# Patient Record
Sex: MALE | Race: WHITE | NOT HISPANIC OR LATINO | Employment: FULL TIME | URBAN - METROPOLITAN AREA
[De-identification: names, ages, dates, MRNs, and addresses within clinical notes are randomized per-mention and may not be internally consistent; named-entity substitution may affect disease eponyms.]

---

## 2018-04-17 ENCOUNTER — OFFICE VISIT (OUTPATIENT)
Dept: FAMILY MEDICINE CLINIC | Facility: CLINIC | Age: 33
End: 2018-04-17
Payer: OTHER MISCELLANEOUS

## 2018-04-17 ENCOUNTER — TRANSCRIBE ORDERS (OUTPATIENT)
Dept: FAMILY MEDICINE CLINIC | Facility: CLINIC | Age: 33
End: 2018-04-17

## 2018-04-17 VITALS
OXYGEN SATURATION: 95 % | HEART RATE: 63 BPM | BODY MASS INDEX: 37.28 KG/M2 | DIASTOLIC BLOOD PRESSURE: 78 MMHG | SYSTOLIC BLOOD PRESSURE: 118 MMHG | RESPIRATION RATE: 17 BRPM | HEIGHT: 70 IN | TEMPERATURE: 98.4 F | WEIGHT: 260.4 LBS

## 2018-04-17 DIAGNOSIS — S52.022A OLECRANON FRACTURE, LEFT, CLOSED, INITIAL ENCOUNTER: ICD-10-CM

## 2018-04-17 DIAGNOSIS — Z01.818 PREOP EXAMINATION: Primary | ICD-10-CM

## 2018-04-17 DIAGNOSIS — S49.92XA ARM INJURY, LEFT, INITIAL ENCOUNTER: Primary | ICD-10-CM

## 2018-04-17 PROCEDURE — 99243 OFF/OP CNSLTJ NEW/EST LOW 30: CPT | Performed by: FAMILY MEDICINE

## 2018-04-17 NOTE — PROGRESS NOTES
Assessment/Plan:   1  Preop examination  Patient appears well today  He has a good functional capacity and no active cardiac problems  Reviewed the CT imaging with him  He is scheduled for fixation of this fracture on 4/19  This type of procedure is considered intermediate risk  Patient is cleared with low perioperative cardiovascular risk  No further testing needed today  2  Olecranon fracture, left, closed, initial encounter       There are no diagnoses linked to this encounter  Subjective:    Chief Complaint   Patient presents with    Pre-op Exam     Shattered Elbow 04/19 @ [address] [facility]        Patient ID: Angela Stahl is a 27 y o  male  Angela Stahl  27 y o   male    SURGEON: Dr Samreen Paredes: Left elbow fixation    DATE OF SURGERY: 4/19/18    PRIOR ANESTHESIA:no    COMPLICATION: no    BLEEDING PROBLEM: no    PERTINENT PMH: no    EXERCISE CAPACITY:   CAN WALK 4 BLOCKS AND OR CLIMB 2 FLIGHTS: Yes    HOME LIVING SITUATION SAFE AND SECURE: Yes      TOBACCO: no     ETOH: yes occasionally      ILLEGAL DRUGS: no        Review of Systems   Constitutional: Negative for activity change, chills, fatigue and fever  HENT: Negative for congestion, ear pain, sinus pressure and sore throat  Eyes: Negative for redness, itching and visual disturbance  Respiratory: Negative for cough and shortness of breath  Cardiovascular: Negative for chest pain and palpitations  Gastrointestinal: Negative for abdominal pain, diarrhea and nausea  Endocrine: Negative for cold intolerance and heat intolerance  Genitourinary: Negative for dysuria, flank pain and frequency  Musculoskeletal: Negative for arthralgias, back pain, gait problem and myalgias  Skin: Negative for color change  Allergic/Immunologic: Negative for environmental allergies  Neurological: Negative for dizziness, numbness and headaches  Psychiatric/Behavioral: Negative for behavioral problems and sleep disturbance  The following portions of the patient's history were reviewed and updated as appropriate : past family history, past medical history, past social history and past surgical history  Objective:    Vitals:    04/17/18 1302   BP: 118/78   BP Location: Right arm   Patient Position: Sitting   Cuff Size: Large   Pulse: 63   Resp: 17   Temp: 98 4 °F (36 9 °C)   TempSrc: Tympanic   SpO2: 95%   Weight: 118 kg (260 lb 6 4 oz)   Height: 5' 9 5" (1 765 m)        Physical Exam   Constitutional: He is oriented to person, place, and time  He appears well-developed and well-nourished  HENT:   Head: Normocephalic and atraumatic  Nose: Nose normal    Mouth/Throat: No oropharyngeal exudate  Eyes: Pupils are equal, round, and reactive to light  Right eye exhibits no discharge  Left eye exhibits no discharge  Neck: Normal range of motion  Neck supple  No tracheal deviation present  Cardiovascular: Normal rate, regular rhythm and intact distal pulses  Exam reveals no gallop and no friction rub  No murmur heard  Pulses:       Dorsalis pedis pulses are 2+ on the right side, and 2+ on the left side  Posterior tibial pulses are 2+ on the right side, and 2+ on the left side  Pulmonary/Chest: Effort normal and breath sounds normal  No respiratory distress  He has no wheezes  He has no rales  Abdominal: Soft  Bowel sounds are normal  He exhibits no distension  There is no tenderness  There is no rebound and no guarding  Musculoskeletal: Normal range of motion  He exhibits no edema  Left elbow in splint  Limited mobility   Lymphadenopathy:        Head (right side): No submental and no submandibular adenopathy present  Head (left side): No submental and no submandibular adenopathy present  He has no cervical adenopathy  Right cervical: No superficial cervical, no deep cervical and no posterior cervical adenopathy present         Left cervical: No superficial cervical, no deep cervical and no posterior cervical adenopathy present  Neurological: He is alert and oriented to person, place, and time  No cranial nerve deficit or sensory deficit  Skin: Skin is warm, dry and intact  Psychiatric: His speech is normal and behavior is normal  Judgment normal  His mood appears not anxious  Cognition and memory are normal  He does not exhibit a depressed mood  Vitals reviewed

## 2018-04-18 ENCOUNTER — TELEPHONE (OUTPATIENT)
Dept: FAMILY MEDICINE CLINIC | Facility: CLINIC | Age: 33
End: 2018-04-18

## 2018-04-18 NOTE — TELEPHONE ENCOUNTER
LMOM for patient to call the office  Dr Kartik Saenz has completed patient's pre-op note; however; we do not have the name of a surgeon to sent patient's note to

## 2018-04-19 NOTE — TELEPHONE ENCOUNTER
I spoke to the pt and he stated to me that he does not know the name of his surgeon, but gave me the coordinator's name Leta Saucedo and phone number of [phone]

## 2018-04-20 ENCOUNTER — TRANSCRIBE ORDERS (OUTPATIENT)
Dept: FAMILY MEDICINE CLINIC | Facility: CLINIC | Age: 33
End: 2018-04-20

## 2022-09-06 ENCOUNTER — TRANSITIONAL CARE MANAGEMENT (OUTPATIENT)
Dept: FAMILY MEDICINE CLINIC | Facility: CLINIC | Age: 37
End: 2022-09-06

## 2022-09-12 ENCOUNTER — OFFICE VISIT (OUTPATIENT)
Dept: FAMILY MEDICINE CLINIC | Facility: CLINIC | Age: 37
End: 2022-09-12
Payer: COMMERCIAL

## 2022-09-12 VITALS
TEMPERATURE: 97.5 F | SYSTOLIC BLOOD PRESSURE: 110 MMHG | DIASTOLIC BLOOD PRESSURE: 70 MMHG | HEIGHT: 68 IN | OXYGEN SATURATION: 95 % | RESPIRATION RATE: 16 BRPM | HEART RATE: 78 BPM | WEIGHT: 238.6 LBS | BODY MASS INDEX: 36.16 KG/M2

## 2022-09-12 DIAGNOSIS — Z90.49 S/P CHOLECYSTECTOMY: Primary | ICD-10-CM

## 2022-09-12 DIAGNOSIS — Z11.59 NEED FOR HEPATITIS C SCREENING TEST: ICD-10-CM

## 2022-09-12 DIAGNOSIS — K81.2 ACUTE ON CHRONIC CHOLECYSTITIS: ICD-10-CM

## 2022-09-12 DIAGNOSIS — R79.89 ELEVATED SERUM CREATININE: ICD-10-CM

## 2022-09-12 DIAGNOSIS — Z13.6 SCREENING FOR CARDIOVASCULAR CONDITION: ICD-10-CM

## 2022-09-12 DIAGNOSIS — Z11.4 SCREENING FOR HIV (HUMAN IMMUNODEFICIENCY VIRUS): ICD-10-CM

## 2022-09-12 PROCEDURE — 99496 TRANSJ CARE MGMT HIGH F2F 7D: CPT | Performed by: FAMILY MEDICINE

## 2022-09-12 RX ORDER — OXYCODONE HYDROCHLORIDE 5 MG/1
5 TABLET ORAL EVERY 6 HOURS PRN
COMMUNITY
Start: 2022-09-05 | End: 2022-09-12

## 2022-09-12 NOTE — ASSESSMENT & PLAN NOTE
Advised to repeat labs; appears to have been elevated in past; given young age may benefit from nephrology evaluation

## 2022-09-12 NOTE — PROGRESS NOTES
Assessment/Plan:     1  S/P cholecystectomy  Assessment & Plan:  Doing well s/p surgery; advised f/u with surgeon as scheduled; f/u guidance given should any concerning sx develop      2  Acute on chronic cholecystitis    3  Elevated serum creatinine  Assessment & Plan:  Advised to repeat labs; appears to have been elevated in past; given young age may benefit from nephrology evaluation    Orders:  -     Comprehensive metabolic panel; Future    4  Need for hepatitis C screening test  -     Hepatitis C Antibody ([facility]); Future    5  Screening for HIV (human immunodeficiency virus)  -     HIV 1/2 Antigen/Antibody (4th Generation) w Reflex [facility]; Future    6  Screening for cardiovascular condition  -     Lipid Panel with Direct LDL reflex; Future        Subjective:      Patient ID: Latasha Chaves is a 43 y o  male  Patient is here for hospital follow up s/p cholecystectomy on 9/5/22  Pt was admitted from 9/5/22-9/6/22  Patient admits he has not done much since being home as he has weight limit for lifting with surgery  Has f/u with surgery on Thursday  Is not requiring any pain medication  No vomiting or nausea  Tolerating diet  Last week had some diarrhea but normal currently  No fevers  Hospital Course:   "43 y o  male admitted for symptomatic cholelithiasis, found to be acute on chronic cholecystitis who underwent CHOLECYSTECTOMY, LAPAROSCOPIC (Abdomen) on 9/5/22  The patient tolerated the procedure well and there were no surgical complications  The patient tolerated a regular diet, ambulated, voided, pain was controlled   After meeting all criteria the patient was discharged home with plans to follow up outpatient "        The following portions of the patient's history were reviewed and updated as appropriate: allergies, current medications, past family history, past medical history, past social history, past surgical history, and problem list     Review of Systems   Constitutional: Negative for chills and fever  Respiratory: Negative for shortness of breath  Cardiovascular: Negative for chest pain  Gastrointestinal: Negative for blood in stool, diarrhea, nausea and vomiting  Objective:      /70 (BP Location: Right arm, Patient Position: Sitting, Cuff Size: Large)   Pulse 78   Temp 97 5 °F (36 4 °C) (Temporal)   Resp 16   Ht 5' 8" (1 727 m) Comment: Per pt  Wt 108 kg (238 lb 9 6 oz)   SpO2 95%   BMI 36 28 kg/m²          Physical Exam  Vitals reviewed  Constitutional:       General: He is not in acute distress  Appearance: Normal appearance  He is not ill-appearing, toxic-appearing or diaphoretic  HENT:      Head: Normocephalic and atraumatic  Eyes:      General: No scleral icterus  Right eye: No discharge  Left eye: No discharge  Conjunctiva/sclera: Conjunctivae normal    Cardiovascular:      Rate and Rhythm: Normal rate and regular rhythm  Pulses: Normal pulses  Heart sounds: Normal heart sounds  No murmur heard  No gallop  Pulmonary:      Effort: Pulmonary effort is normal  No respiratory distress  Breath sounds: Normal breath sounds  No stridor  No wheezing, rhonchi or rales  Abdominal:      Palpations: Abdomen is soft  Tenderness: There is no guarding or rebound  Comments: +incisions-clean, dry, no drainage or erythema   Musculoskeletal:      Right lower leg: No edema  Left lower leg: No edema  Neurological:      General: No focal deficit present  Mental Status: He is alert and oriented to person, place, and time  Psychiatric:         Mood and Affect: Mood normal          Behavior: Behavior normal          Thought Content:  Thought content normal          Judgment: Judgment normal

## 2022-09-12 NOTE — ASSESSMENT & PLAN NOTE
Doing well s/p surgery; advised f/u with surgeon as scheduled; f/u guidance given should any concerning sx develop

## 2022-11-17 ENCOUNTER — OFFICE VISIT (OUTPATIENT)
Dept: FAMILY MEDICINE CLINIC | Facility: CLINIC | Age: 37
End: 2022-11-17

## 2022-11-17 ENCOUNTER — APPOINTMENT (OUTPATIENT)
Dept: LAB | Facility: CLINIC | Age: 37
End: 2022-11-17

## 2022-11-17 VITALS
BODY MASS INDEX: 37.1 KG/M2 | SYSTOLIC BLOOD PRESSURE: 116 MMHG | WEIGHT: 244.8 LBS | TEMPERATURE: 98.4 F | OXYGEN SATURATION: 96 % | DIASTOLIC BLOOD PRESSURE: 78 MMHG | HEIGHT: 68 IN | RESPIRATION RATE: 18 BRPM | HEART RATE: 47 BPM

## 2022-11-17 DIAGNOSIS — Z11.59 NEED FOR HEPATITIS C SCREENING TEST: ICD-10-CM

## 2022-11-17 DIAGNOSIS — R79.89 ELEVATED SERUM CREATININE: ICD-10-CM

## 2022-11-17 DIAGNOSIS — Z13.6 SCREENING FOR CARDIOVASCULAR CONDITION: ICD-10-CM

## 2022-11-17 DIAGNOSIS — Z23 NEED FOR TDAP VACCINATION: ICD-10-CM

## 2022-11-17 DIAGNOSIS — Z00.00 ANNUAL PHYSICAL EXAM: Primary | ICD-10-CM

## 2022-11-17 DIAGNOSIS — R07.89 ATYPICAL CHEST PAIN: ICD-10-CM

## 2022-11-17 DIAGNOSIS — Z11.4 SCREENING FOR HIV (HUMAN IMMUNODEFICIENCY VIRUS): ICD-10-CM

## 2022-11-17 LAB
ALBUMIN SERPL BCP-MCNC: 3.5 G/DL (ref 3.5–5)
ALP SERPL-CCNC: 67 U/L (ref 46–116)
ALT SERPL W P-5'-P-CCNC: 43 U/L (ref 12–78)
ANION GAP SERPL CALCULATED.3IONS-SCNC: 5 MMOL/L (ref 4–13)
AST SERPL W P-5'-P-CCNC: 17 U/L (ref 5–45)
BILIRUB SERPL-MCNC: 0.45 MG/DL (ref 0.2–1)
BUN SERPL-MCNC: 21 MG/DL (ref 5–25)
CALCIUM SERPL-MCNC: 9.3 MG/DL (ref 8.3–10.1)
CHLORIDE SERPL-SCNC: 110 MMOL/L (ref 96–108)
CHOLEST SERPL-MCNC: 186 MG/DL
CO2 SERPL-SCNC: 26 MMOL/L (ref 21–32)
CREAT SERPL-MCNC: 1.32 MG/DL (ref 0.6–1.3)
GFR SERPL CREATININE-BSD FRML MDRD: 67 ML/MIN/1.73SQ M
GLUCOSE P FAST SERPL-MCNC: 103 MG/DL (ref 65–99)
HCV AB SER QL: NORMAL
HDLC SERPL-MCNC: 44 MG/DL
LDLC SERPL CALC-MCNC: 123 MG/DL (ref 0–100)
POTASSIUM SERPL-SCNC: 4.4 MMOL/L (ref 3.5–5.3)
PROT SERPL-MCNC: 7.9 G/DL (ref 6.4–8.4)
SODIUM SERPL-SCNC: 141 MMOL/L (ref 135–147)
TRIGL SERPL-MCNC: 95 MG/DL

## 2022-11-17 RX ORDER — OMEPRAZOLE 20 MG/1
20 CAPSULE, DELAYED RELEASE ORAL DAILY
Qty: 30 CAPSULE | Refills: 0 | Status: SHIPPED | OUTPATIENT
Start: 2022-11-17

## 2022-11-17 NOTE — PROGRESS NOTES
ADULT ANNUAL [facility]    NAME: Jez Riddle  AGE: 42 y o  SEX: male  : 1985     DATE: 2022     Assessment and Plan:     Problem List Items Addressed This Visit    None  Visit Diagnoses     Annual physical exam    -  Primary    BMI 37 0-37 9, adult              Immunizations and preventive care screenings were discussed with patient today  Appropriate education was printed on patient's after visit summary  Counseling:  Alcohol/drug use: discussed moderation in alcohol intake, the recommendations for healthy alcohol use, and avoidance of illicit drug use  Dental Health: discussed importance of regular tooth brushing, flossing, and dental visits  Injury prevention: discussed safety/seat belts, safety helmets, smoke detectors, carbon dioxide detectors, and smoking near bedding or upholstery  Sexual health: discussed sexually transmitted diseases, partner selection, use of condoms, avoidance of unintended pregnancy, and contraceptive alternatives  · Exercise: the importance of regular exercise/physical activity was discussed  Recommend exercise 3-5 times per week for at least 30 minutes  Return in 1 year (on 2023)  Chief Complaint:     Chief Complaint   Patient presents with   • Physical Exam      History of Present Illness:     Adult Annual Physical   Patient here for a comprehensive physical exam  The patient reports problems - chest pain  Diet and Physical Activity  · Diet/Nutrition: well balanced diet and limited junk food  · Exercise: walking  Depression Screening  PHQ-2/9 Depression Screening         General Health  · Sleep: sleeps poorly and gets 4-6 hours of sleep on average  · Hearing: normal - bilateral   · Vision: no vision problems     · Dental: Dentures over maxilla        Health  · History of STDs?: no      Review of Systems:     Review of Systems   Constitutional: Negative for activity change, chills, fatigue and fever  HENT: Negative for congestion, ear pain, sinus pressure and sore throat  Eyes: Negative for redness, itching and visual disturbance  Respiratory: Negative for cough and shortness of breath  Cardiovascular: Negative for chest pain and palpitations  Gastrointestinal: Negative for abdominal pain, diarrhea and nausea  Endocrine: Negative for cold intolerance and heat intolerance  Genitourinary: Negative for dysuria, flank pain and frequency  Musculoskeletal: Negative for arthralgias, back pain, gait problem and myalgias  Skin: Negative for color change  Allergic/Immunologic: Negative for environmental allergies  Neurological: Negative for dizziness, numbness and headaches  Psychiatric/Behavioral: Negative for behavioral problems and sleep disturbance  Past Medical History:     Past Medical History:   Diagnosis Date   • Condyloma acuminatum       Past Surgical History:     Past Surgical History:   Procedure Laterality Date   • TONSILLECTOMY AND ADENOIDECTOMY        Social History:     Social History     Socioeconomic History   • Marital status:      Spouse name: None   • Number of children: None   • Years of education: None   • Highest education level: None   Occupational History   • None   Tobacco Use   • Smoking status: Never   • Smokeless tobacco: Never   Substance and Sexual Activity   • Alcohol use: Not Currently     Comment: social   • Drug use: No   • Sexual activity: None   Other Topics Concern   • None   Social History Narrative   • None     Social Determinants of Health     Financial Resource Strain: Not on file   Food Insecurity: Not on file   Transportation Needs: Not on file   Physical Activity: Not on file   Stress: Not on file   Social Connections: Not on file   Intimate Partner Violence: Not on file   Housing Stability: Not on file      Family History:     History reviewed  No pertinent family history     Current Medications:     No current outpatient medications on file  No current facility-administered medications for this visit  Allergies:     No Known Allergies   Physical Exam:     /78   Pulse (!) 47   Temp 98 4 °F (36 9 °C) (Tympanic)   Resp 18   Ht 5' 8" (1 727 m)   Wt 111 kg (244 lb 12 8 oz)   SpO2 96%   BMI 37 22 kg/m²     Physical Exam  Vitals reviewed  Constitutional:       General: He is not in acute distress  Appearance: He is well-developed  He is not diaphoretic  HENT:      Head: Normocephalic and atraumatic  No right periorbital erythema or left periorbital erythema  Right Ear: Tympanic membrane normal  No decreased hearing noted  Left Ear: Tympanic membrane normal  No decreased hearing noted  Nose: Nose normal       Right Sinus: No maxillary sinus tenderness or frontal sinus tenderness  Left Sinus: No maxillary sinus tenderness or frontal sinus tenderness  Mouth/Throat:      Lips: Pink  Mouth: Mucous membranes are moist       Pharynx: No oropharyngeal exudate  Tonsils: No tonsillar exudate or tonsillar abscesses  Eyes:      General: Lids are normal       Extraocular Movements: Extraocular movements intact  Conjunctiva/sclera: Conjunctivae normal       Pupils: Pupils are equal, round, and reactive to light  Neck:      Thyroid: No thyroid mass  Trachea: Trachea normal    Cardiovascular:      Rate and Rhythm: Normal rate and regular rhythm  Pulses: Normal pulses  Heart sounds: Normal heart sounds  No murmur heard  No friction rub  No gallop  Pulmonary:      Effort: Pulmonary effort is normal  No respiratory distress  Breath sounds: Normal breath sounds  No wheezing or rales  Abdominal:      General: Bowel sounds are normal  There is no distension  Palpations: Abdomen is soft  There is no mass  Tenderness: There is no abdominal tenderness  There is no guarding     Musculoskeletal:         General: Normal range of motion  Cervical back: Normal range of motion and neck supple  Skin:     General: Skin is warm and dry  Coloration: Skin is not pale  Findings: No erythema or rash  Neurological:      Mental Status: He is alert and oriented to person, place, and time  Cranial Nerves: No cranial nerve deficit  Coordination: Coordination normal    Psychiatric:         Attention and Perception: Attention and perception normal          Mood and Affect: Mood and affect normal          Speech: Speech normal          Behavior: Behavior normal          Thought Content:  Thought content normal          Cognition and Memory: Cognition and memory normal          Judgment: Judgment normal           [facility]hab DO CARLOS Stover [address]

## 2022-11-17 NOTE — PROGRESS NOTES
Assessment/Plan:   1  Atypical chest pain  Reviewed patient's symptoms today  At this time, symptoms appear likely secondary to gastrointestinal irritation  His EKG today appear to show sinus bradycardia, no ST or T-wave changes  Given patient's history, he does consume alcohol  This may likely cause gastric irritation  Will start treatment omeprazole 20 mg daily for the next 30 days  Follow-up if any symptoms are persisting   - POCT ECG  - omeprazole (PriLOSEC) 20 mg delayed release capsule; Take 1 capsule (20 mg total) by mouth daily  Dispense: 30 capsule; Refill: 0    2  Need for Tdap vaccination  - TDAP VACCINE GREATER THAN OR EQUAL TO 8YO IM           Diagnoses and all orders for this visit:    Annual physical exam    BMI 37 0-37 9, adult          Subjective:       Chief Complaint   Patient presents with   • Physical Exam      Patient ID: Marlin Rothman is a 42 y o  male presents today with CC of intermittent chest discomfort  He has had this pain for the past few months  Symptoms started gradually  He states that the symptoms can developed at random times  They only last for a short period time  He states that this is located over his epigastric area as well as his sternum  He denies any radiation of the pain  He denies any associated symptoms of palpitations, shortness of breath or lightheadedness  He has not taken anything for symptom relief  HPI    Review of Systems   Constitutional: Negative for activity change, chills, fatigue and fever  HENT: Negative for congestion, ear pain, sinus pressure and sore throat  Eyes: Negative for redness, itching and visual disturbance  Respiratory: Negative for cough and shortness of breath  Cardiovascular: Negative for chest pain and palpitations  Gastrointestinal: Negative for abdominal pain, diarrhea and nausea  Endocrine: Negative for cold intolerance and heat intolerance  Genitourinary: Negative for dysuria, flank pain and frequency  Musculoskeletal: Negative for arthralgias, back pain, gait problem and myalgias  Skin: Negative for color change  Allergic/Immunologic: Negative for environmental allergies  Neurological: Negative for dizziness, numbness and headaches  Psychiatric/Behavioral: Negative for behavioral problems and sleep disturbance  The following portions of the patient's history were reviewed and updated as appropriate : past family history, past medical history, past social history and past surgical history  No current outpatient medications on file  Objective:         Vitals:    11/17/22 0755   BP: 116/78   Pulse: (!) 47   Resp: 18   Temp: 98 4 °F (36 9 °C)   TempSrc: Tympanic   SpO2: 96%   Weight: 111 kg (244 lb 12 8 oz)   Height: 5' 8" (1 727 m)     Physical Exam  Vitals reviewed  Constitutional:       Appearance: He is well-developed  HENT:      Head: Normocephalic and atraumatic  Nose: Nose normal       Mouth/Throat:      Pharynx: No oropharyngeal exudate  Eyes:      General: No scleral icterus  Right eye: No discharge  Left eye: No discharge  Pupils: Pupils are equal, round, and reactive to light  Neck:      Trachea: No tracheal deviation  Cardiovascular:      Rate and Rhythm: Normal rate and regular rhythm  Pulses:           Dorsalis pedis pulses are 2+ on the right side and 2+ on the left side  Posterior tibial pulses are 2+ on the right side and 2+ on the left side  Heart sounds: Normal heart sounds  No murmur heard  No friction rub  No gallop  Pulmonary:      Effort: Pulmonary effort is normal  No respiratory distress  Breath sounds: Normal breath sounds  No wheezing or rales  Abdominal:      General: Bowel sounds are normal  There is no distension  Palpations: Abdomen is soft  Tenderness: There is no abdominal tenderness  There is no guarding or rebound  Musculoskeletal:         General: Normal range of motion  Cervical back: Normal range of motion and neck supple  Lymphadenopathy:      Head:      Right side of head: No submental or submandibular adenopathy  Left side of head: No submental or submandibular adenopathy  Cervical: No cervical adenopathy  Right cervical: No superficial, deep or posterior cervical adenopathy  Left cervical: No superficial, deep or posterior cervical adenopathy  Skin:     General: Skin is warm and dry  Findings: No erythema  Neurological:      Mental Status: He is alert and oriented to person, place, and time  Cranial Nerves: No cranial nerve deficit  Sensory: No sensory deficit  Psychiatric:         Mood and Affect: Mood is not anxious or depressed  Speech: Speech normal          Behavior: Behavior normal          Thought Content:  Thought content normal          Judgment: Judgment normal

## 2022-11-17 NOTE — PATIENT INSTRUCTIONS

## 2022-11-21 LAB — HIV 1+2 AB+HIV1 P24 AG SERPL QL IA: NORMAL

## 2022-11-27 DIAGNOSIS — R79.89 ELEVATED SERUM CREATININE: Primary | ICD-10-CM

## 2022-11-28 ENCOUNTER — TELEPHONE (OUTPATIENT)
Dept: NEPHROLOGY | Facility: CLINIC | Age: 37
End: 2022-11-28

## 2022-11-28 NOTE — TELEPHONE ENCOUNTER
cNew Patient Intake Form   Patient Details   Hero Medina     1985     [id number]     Insurance Information   Name of [facility]    Does the patient need an insurance referral? no   If patient has Homerrafal Carlin, please ask if they will be using their Aryan Ellisones  Appointment Information   Who is calling to schedule? If not patient, what is callers name? Patient   Referring Provider Dr Ena Franco   Referring Provider Number [phone]    Reason for Appt (Diagnosis) Elevated serum creatinine    Is patient aware of why they are being referred? n/a   Does Patient have labs done at [facility]? If not, where do they go? yes   Has patient had labs / urine work done? List date of most recent lab / urine work yes   Has patient had a BMP & CBC done in the past 2 years? If so, list the date yes   Has patient been hospitalized recently? If yes, list name and location of hospital they were In 09/5/22   Has patient been seen by a Nephrologist before? If yes, list name, location and phone number no   Has patient been see by another Speciality before (ex  Neurology, urology, cardiology)? If yes, please list name, and speciality  pcp   Has the patient had imaging done? If so, list the most recent date and type of imagineg no   Does the patient has a stone analysis report if history of kidney stones? n/a   Appointment Details   Is there a referral on file?  yes   Appointment Date 02/17/2023   Location [place]    [facility]  In December patient will be getting [facility]

## 2023-02-17 ENCOUNTER — CONSULT (OUTPATIENT)
Dept: NEPHROLOGY | Facility: CLINIC | Age: 38
End: 2023-02-17

## 2023-02-17 ENCOUNTER — TELEPHONE (OUTPATIENT)
Dept: NEPHROLOGY | Facility: CLINIC | Age: 38
End: 2023-02-17

## 2023-02-17 VITALS
WEIGHT: 239 LBS | BODY MASS INDEX: 36.22 KG/M2 | DIASTOLIC BLOOD PRESSURE: 72 MMHG | HEIGHT: 68 IN | SYSTOLIC BLOOD PRESSURE: 108 MMHG

## 2023-02-17 DIAGNOSIS — R79.89 ELEVATED SERUM CREATININE: Primary | ICD-10-CM

## 2023-02-17 DIAGNOSIS — R31.9 HEMATURIA, UNSPECIFIED TYPE: ICD-10-CM

## 2023-02-17 DIAGNOSIS — R80.9 PROTEINURIA, UNSPECIFIED TYPE: ICD-10-CM

## 2023-02-17 NOTE — PATIENT INSTRUCTIONS
- get blood work after the visit  - get kidney ultrasound done    - Please call me in 10 days after having your blood work done to review the results if you do not hear back from me or my office, as I may have not received the results  - please remember to perform blood work prior to the next visit  - Please call if the blood pressure top number is greater than 150 or less than 110 consistently  - Please call if you are gaining more than 2lbs in 2 days for adjustment of water pills   ~ Please AVOID the following pain medications  LIST OF NSAIDS (NONSTEROIDAL ANTI-INFLAMMATORY DRUGS) AND SUMNER-2 INHIBITORS    DIFLUNISAL (DOLOBID)  IBUPROFEN (MOTRIN, ADVIL)  FLURBIPROFEN (ANSAID)  KETOPROFEN (ORUDIS, ORUVAIL)  FENOPROFEN (NALFON)  NABUMETONE (RELAFEN)  PIROXICAM (FELDENE)  NAPROXEN (ALEVE, NAPROSYN, NAPRELAN, ANAPROX)  DICLOFENAC (VOLTAREN, CATAFLAM)  INDOMETHACIN (INDOCIN)  SULINDAC (CLINORIL)  TOLMETIN (TOLETIN)  ETODOLAC (LODINE)  MELOXICAM (MOBIC)  KETOROLAC (TORADOL)  OXAPROZIN (DAYPRO)  CELECOXIB (CELEBREX)    Exercise to Lose Weight     Exercise and a healthy diet may help you lose weight  Your doctor may suggest specific exercises  EXERCISE IDEAS AND TIPS     Choose low-cost things you enjoy doing, such as walking, bicycling, or exercising to workout videos  Take stairs instead of the elevator  Walk during your lunch break  Park your car further away from work or school  Go to a gym or an exercise class  Start with 5 to 10 minutes of exercise each day  Build up to 30 minutes of exercise 4 to 6 days a week  Wear shoes with good support and comfortable clothes  Stretch before and after working out  Work out until you breathe harder and your heart beats faster  Drink extra water when you exercise  Do not do so much that you hurt yourself, feel dizzy, or get very short of breath  Exercises that burn about 150 calories:   Running 1 ½ miles in 15 minutes     Playing volleyball for 45 to 60 minutes  Washing and waxing a car for 45 to 60 minutes  Playing touch football for 45 minutes  Walking 1 ¾ miles in 35 minutes  Pushing a stroller 1 ½ miles in 30 minutes  Playing basketball for 30 minutes  Raking leaves for 30 minutes  Bicycling 5 miles in 30 minutes  Walking 2 miles in 30 minutes  Dancing for 30 minutes  Shoveling snow for 15 minutes  Swimming laps for 20 minutes  Walking up stairs for 15 minutes  Bicycling 4 miles in 15 minutes  Gardening for 30 to 45 minutes  Jumping rope for 15 minutes  Washing windows or floors for 45 to 60 minutes

## 2023-02-17 NOTE — TELEPHONE ENCOUNTER
Called patient to move appointment to 05/16 @ 2pm or 5/17 @ 3pm serina per Dr Villalpando  No voicemail is available

## 2023-02-17 NOTE — PROGRESS NOTES
Nephrology Initial Consultation  Neal Sorensen 42 y o  male MRN: [id number]            REASON FOR CONSULTATION:  Neal Sorensen is a 42 y o male who was referred by Oscar Austin DO for evaluation of Consult and Abnormal Lab    ASSESSMENT / PLAN:   42 y o   male with  no significant pmh presents to the office for evaluation and management of elevated serum creatinine levels  Elevated creatinine/hematuria:  - After review of records in In [facility] as well as Care everywhere patient has a baseline creatinine of 1 3-1 5 mg/dL as far back as 2020  Most recent labs show a Creatinine of 1 32 mg/dL on 11/17/2022  Renal function remains stable  Get blood work after the visit  -Patient likely has underlying elevated creatinine due to muscular build/body habitus  At this time it is unclear to say if patient has underlying CKD stage II or not we will need to check UA, renal ultrasound check for proteinuria and hematuria  Review of prior records does show intermittent hematuria and proteinuria on prior UAs  We will also check renal function panel along with simultaneous Cystatin C levels to estimate true GFR   -He may have intermittent hematuria secondary to nephrolithiasis as imaging did show him having a stone he denies knowledge of ever passing it  Hematuria and proteinuria could also be secondary to NSAID usage  -Depending on initial work-up then will decipher if patient needs further detailed working such as to rule out for underlying GN or not and if he has a diagnosis of CKD or not  - Will check renal ultrasound to evaluate kidney size, echogenicity and r/o cysts  -CT abdomen from 6/22/2020 showed mild left hydronephrosis 3 mm calculus at the left ureterovesical vesicle junction right kidney normal  - Acid base and lytes stable    - Clinically the patient appears to be euvolemic to minimally hypovolemic encourage p o  hydration  -Did discuss with the patient does he have risk factors for CKD such as NSAID usage as well as obesity to hyperfiltration  - Recommend to avoid use of NSAIDs, nephrotoxins  Caution advised with regards to exposure to IV contrast dye    - Discussed with the patient in depth his renal status  - Advised the patient that when his GFR is close to 20mL/min then will start discussing about RRT(renal replacement therapy) options such as renal transplant, peritoneal dialysis and hemodialysis  - Informed the patient about the various options for Renal Replacement therapy  - Discussed with the patient how we need to work together to delay the progression of CKD with optimal BP control based on their age and co-morbidities and trying to reduce proteinuria by the use of anti-proteinuric agents  Blood pressure:  - Patient is on no medications  - Goal BP of <  130/80 based on age and comorbidities  - Instructed to follow low sodium (2gm)diet  Hemoglobin:  - Goal Hb of 10-12 g/dL  - Most recent labs suggestive of 14 1 g/dL  -No role for IV iron    MBD(Mineral Bone Disease): - Based on patients CKD stage following is the goal of therapy  - Maintain calcium phosphorus product of < 55  Proteinuria/hematuria:  - most recent UA from 6/22/2020 showing positive protein no blood, however UA from 6/10/2020 did have blood and protein both concentrated urine specimens  Also at that time he was noted to have mild left hydronephrosis and ureteral stone  - check protein creatinine ratio, UA  - currently on therapy for proteinuria with no medications  -See above    Lipids:  - goal LDL less than 70  - Management as per PCP    Nutrition/obesity:  - Encouraged patient to follow a diet comprising of moderate potassium, low phosphorus and protein restriction to 0 8gm/kg, increase hydration  Advised of dietary habits and weight loss  - Will check serum albumin with next blood work       Followup:  - Patient is to follow-up in 4 months, with lab work to be performed after the visit and then again in a few days prior to the next visit  Advised patient to call me in 10 days to review the results if they do not hear back from me, as I may have not received the results  Edgard MD Yann, [facility], 2/17/2023, 2:44 PM             HISTORY OF PRESENT ILLNESS: 42 y o  male with no significant pmh presents to the office for evaluation and management of elevated serum creatinine levels  Review of record shows patient has a baseline creatinine of 1 3 to 1 5 mg/dL dating as far back as 2020  Recent labs from 11/17/2022 with a creatinine of 1 32 mg/dL  Use to do heavy alcohol use, but now more social  Takes advil 2-5 pills a week few times a month  Never seen a nephrologist before no history of SARAH needing dialysis is not aware of passing a stone however does remember may be somebody mentioning that he has a kidney stone long time ago  No recent hospitalization no issues with edema not taking creatine or any muscle supplements  In the past used to drink a lot of alcohol however now does not  Does not drink much water during the day thankful for the care information that is gone today  Agreeable to getting initial blood work and then decide on further testing if needed  Review of Systems   Constitutional: Negative for chills and fever  HENT: Positive for postnasal drip and rhinorrhea  Negative for congestion and sore throat  Respiratory: Negative for cough, shortness of breath and wheezing  Cardiovascular: Negative for leg swelling  Gastrointestinal: Negative for diarrhea, nausea and vomiting  Genitourinary: Negative for difficulty urinating, dysuria and hematuria  Musculoskeletal: Negative for back pain  Skin: Negative for wound  Neurological: Negative for dizziness, light-headedness and headaches  Psychiatric/Behavioral: Negative for agitation and confusion  All other systems reviewed and are negative        PAST MEDICAL HISTORY:  Past Medical History:   Diagnosis Date   • Condyloma acuminatum PROBLEM LIST    Patient Active Problem List   Diagnosis   • Acute on chronic cholecystitis   • S/P cholecystectomy   • Elevated serum creatinine   • Hematuria   • Proteinuria       PAST SURGICAL HISTORY:  Past Surgical History:   Procedure Laterality Date   • TONSILLECTOMY AND ADENOIDECTOMY         SOCIAL HISTORY :   reports that he has never smoked  He has never used smokeless tobacco  He reports that he does not currently use alcohol  He reports that he does not use drugs  FAMILY HISTORY:  History reviewed  No pertinent family history  ALLERGIES:  No Known Allergies        PHYSICAL EXAM:  Vitals:    02/17/23 1414   BP: 108/72   BP Location: Left arm   Patient Position: Sitting   Cuff Size: Large   Weight: 108 kg (239 lb)   Height: 5' 8" (1 727 m)     Body mass index is 36 34 kg/m²  Physical Exam  Vitals reviewed  Constitutional:       General: He is not in acute distress  Appearance: Normal appearance  He is obese  He is not ill-appearing, toxic-appearing or diaphoretic  HENT:      Head: Normocephalic and atraumatic  Mouth/Throat:      Mouth: Mucous membranes are dry  Pharynx: Oropharynx is clear  No oropharyngeal exudate  Eyes:      General: No scleral icterus  Conjunctiva/sclera: Conjunctivae normal    Cardiovascular:      Rate and Rhythm: Normal rate  Heart sounds: Normal heart sounds  No murmur heard  Pulmonary:      Effort: No respiratory distress  Breath sounds: Normal breath sounds  No stridor  No wheezing  Abdominal:      General: There is no distension  Palpations: Abdomen is soft  There is no mass  Tenderness: There is no abdominal tenderness  There is no right CVA tenderness or left CVA tenderness  Musculoskeletal:         General: No swelling  Cervical back: Normal range of motion  No rigidity  Skin:     General: Skin is warm  Coloration: Skin is not jaundiced  Neurological:      General: No focal deficit present        Mental Status: He is alert and oriented to person, place, and time  Psychiatric:         Mood and Affect: Mood normal          Behavior: Behavior normal            LABORATORY DATA:     Results from last 6 Months   Lab Units 11/17/22  0852   POTASSIUM mmol/L 4 4   CHLORIDE mmol/L 110*   CO2 mmol/L 26   BUN mg/dL 21   CREATININE mg/dL 1 32*   CALCIUM mg/dL 9 3        rest all reviewed    RADIOLOGY:  US kidney and bladder    (Results Pending)     Rest all reviewed        MEDICATIONS:  No current outpatient medications on file  Portions of the record may have been created with voice recognition software  Occasional wrong word or "sound a like" substitutions may have occurred due to the inherent limitations of voice recognition software  Read the chart carefully and recognize, using context, where substitutions have occurred  If you have any questions, please contact the dictating provider

## 2023-02-22 ENCOUNTER — HOSPITAL ENCOUNTER (OUTPATIENT)
Dept: ULTRASOUND IMAGING | Facility: HOSPITAL | Age: 38
Discharge: HOME | End: 2023-02-22
Attending: INTERNAL MEDICINE

## 2023-02-22 DIAGNOSIS — R79.89 ELEVATED SERUM CREATININE: ICD-10-CM

## 2023-02-22 DIAGNOSIS — R31.9 HEMATURIA, UNSPECIFIED TYPE: ICD-10-CM

## 2023-02-22 DIAGNOSIS — R80.9 PROTEINURIA, UNSPECIFIED TYPE: ICD-10-CM

## 2023-03-02 ENCOUNTER — TELEPHONE (OUTPATIENT)
Dept: NEPHROLOGY | Facility: CLINIC | Age: 38
End: 2023-03-02

## 2023-03-02 NOTE — TELEPHONE ENCOUNTER
----- Message from Filipe Ramos MD sent at 3/2/2023 12:27 PM EST -----  Please inform patient most recent renal ultrasound stable no blockages no masses we can discuss further at the next visit if any questions or concerns please let me know thank you

## 2023-03-02 NOTE — RESULT ENCOUNTER NOTE
Please inform patient most recent renal ultrasound stable no blockages no masses we can discuss further at the next visit if any questions or concerns please let me know thank you

## 2023-05-16 ENCOUNTER — OFFICE VISIT (OUTPATIENT)
Dept: NEPHROLOGY | Facility: CLINIC | Age: 38
End: 2023-05-16

## 2023-05-16 ENCOUNTER — APPOINTMENT (OUTPATIENT)
Dept: LAB | Facility: CLINIC | Age: 38
End: 2023-05-16

## 2023-05-16 VITALS
WEIGHT: 241 LBS | SYSTOLIC BLOOD PRESSURE: 100 MMHG | DIASTOLIC BLOOD PRESSURE: 76 MMHG | BODY MASS INDEX: 36.53 KG/M2 | HEIGHT: 68 IN

## 2023-05-16 DIAGNOSIS — R79.89 ELEVATED SERUM CREATININE: ICD-10-CM

## 2023-05-16 DIAGNOSIS — R80.9 PROTEINURIA, UNSPECIFIED TYPE: ICD-10-CM

## 2023-05-16 DIAGNOSIS — R79.89 ELEVATED SERUM CREATININE: Primary | ICD-10-CM

## 2023-05-16 DIAGNOSIS — R31.9 HEMATURIA, UNSPECIFIED TYPE: ICD-10-CM

## 2023-05-16 LAB
ALBUMIN SERPL BCP-MCNC: 3.7 G/DL (ref 3.5–5)
ANION GAP SERPL CALCULATED.3IONS-SCNC: 1 MMOL/L (ref 4–13)
BACTERIA UR QL AUTO: NORMAL /HPF
BILIRUB UR QL STRIP: NEGATIVE
BUN SERPL-MCNC: 17 MG/DL (ref 5–25)
CALCIUM SERPL-MCNC: 9.1 MG/DL (ref 8.3–10.1)
CHLORIDE SERPL-SCNC: 110 MMOL/L (ref 96–108)
CLARITY UR: CLEAR
CO2 SERPL-SCNC: 28 MMOL/L (ref 21–32)
COLOR UR: NORMAL
CREAT SERPL-MCNC: 1.33 MG/DL (ref 0.6–1.3)
CREAT UR-MCNC: 109 MG/DL
GFR SERPL CREATININE-BSD FRML MDRD: 66 ML/MIN/1.73SQ M
GLUCOSE P FAST SERPL-MCNC: 123 MG/DL (ref 65–99)
GLUCOSE UR STRIP-MCNC: NEGATIVE MG/DL
HGB UR QL STRIP.AUTO: NEGATIVE
KETONES UR STRIP-MCNC: NEGATIVE MG/DL
LEUKOCYTE ESTERASE UR QL STRIP: NEGATIVE
NITRITE UR QL STRIP: NEGATIVE
NON-SQ EPI CELLS URNS QL MICRO: NORMAL /HPF
PH UR STRIP.AUTO: 6.5 [PH]
PHOSPHATE SERPL-MCNC: 3.1 MG/DL (ref 2.7–4.5)
POTASSIUM SERPL-SCNC: 3.8 MMOL/L (ref 3.5–5.3)
PROT UR STRIP-MCNC: NEGATIVE MG/DL
PROT UR-MCNC: 7 MG/DL
PROT/CREAT UR: 0.06 MG/G{CREAT} (ref 0–0.1)
RBC #/AREA URNS AUTO: NORMAL /HPF
SODIUM SERPL-SCNC: 139 MMOL/L (ref 135–147)
SP GR UR STRIP.AUTO: 1.02 (ref 1–1.03)
UROBILINOGEN UR STRIP-ACNC: <2 MG/DL
WBC #/AREA URNS AUTO: NORMAL /HPF

## 2023-05-16 NOTE — PATIENT INSTRUCTIONS
- Please call me in 10 days after having your blood work done to review the results if you do not hear back from me or my office, as I may have not received the results  - please remember to perform blood work prior to the next visit  - Please call if the blood pressure top number is greater than 140 or less than 110 consistently  - Please call if you are gaining more than 2lbs in 2 days for adjustment of water pills   ~ Please AVOID the following pain medications  LIST OF NSAIDS (NONSTEROIDAL ANTI-INFLAMMATORY DRUGS) AND SUMNER-2 INHIBITORS    DIFLUNISAL (DOLOBID)  IBUPROFEN (MOTRIN, ADVIL)  FLURBIPROFEN (ANSAID)  KETOPROFEN (ORUDIS, ORUVAIL)  FENOPROFEN (NALFON)  NABUMETONE (RELAFEN)  PIROXICAM (FELDENE)  NAPROXEN (ALEVE, NAPROSYN, NAPRELAN, ANAPROX)  DICLOFENAC (VOLTAREN, CATAFLAM)  INDOMETHACIN (INDOCIN)  SULINDAC (CLINORIL)  TOLMETIN (TOLETIN)  ETODOLAC (LODINE)  MELOXICAM (MOBIC)  KETOROLAC (TORADOL)  OXAPROZIN (DAYPRO)  CELECOXIB (CELEBREX)    Exercise to Lose Weight     Exercise and a healthy diet may help you lose weight  Your doctor may suggest specific exercises  EXERCISE IDEAS AND TIPS     Choose low-cost things you enjoy doing, such as walking, bicycling, or exercising to workout videos  Take stairs instead of the elevator  Walk during your lunch break  Park your car further away from work or school  Go to a gym or an exercise class  Start with 5 to 10 minutes of exercise each day  Build up to 30 minutes of exercise 4 to 6 days a week  Wear shoes with good support and comfortable clothes  Stretch before and after working out  Work out until you breathe harder and your heart beats faster  Drink extra water when you exercise  Do not do so much that you hurt yourself, feel dizzy, or get very short of breath  Exercises that burn about 150 calories:   Running 1 ½ miles in 15 minutes  Playing volleyball for 45 to 60 minutes  Washing and waxing a car for 45 to 60 minutes  Playing touch football for 45 minutes  Walking 1 ¾ miles in 35 minutes  Pushing a stroller 1 ½ miles in 30 minutes  Playing basketball for 30 minutes  Raking leaves for 30 minutes  Bicycling 5 miles in 30 minutes  Walking 2 miles in 30 minutes  Dancing for 30 minutes  Shoveling snow for 15 minutes  Swimming laps for 20 minutes  Walking up stairs for 15 minutes  Bicycling 4 miles in 15 minutes  Gardening for 30 to 45 minutes  Jumping rope for 15 minutes  Washing windows or floors for 45 to 60 minutes

## 2023-05-16 NOTE — PROGRESS NOTES
Nephrology Follow up Consultation  Becky Dyer 42 y o  male MRN: [id number]            BACKGROUND:  Becky Dyer is a 42 y o male who was referred by Zakiya Sepulveda DO for evaluation of Follow-up    ASSESSMENT / PLAN:   42 y o   male with  no significant pmh presents to the office for routine follow-up  Elevated creatinine versus possibly CKD stage II secondary to history of nephrolithiasis x 1/hematuria:  - After review of records in In [facility] as well as Care everywhere patient has a baseline creatinine of 1 3-1 5 mg/dL as far back as 2020  Most recent labs show a Creatinine of 1 33 mg/dL on 5/16/23  Renal function remains stable  -Patient likely has underlying elevated creatinine due to muscular build/body habitus    -Based on work-up patient does not seem to have overt underlying CKD at this time  He just has elevated creatinine  Just had 1 episode of nephrolithiasis  Will not declare him as having CKD at this time  -Renal ultrasound from 2/22/2023 bilateral kidneys approximately 10 6 and 11 cm no hydronephrosis no cysts no masses   -Unsure why Cystatin C was not drawn with previous lab work   -He may have intermittent hematuria secondary to nephrolithiasis as imaging did show him having a stone he denies knowledge of ever passing it  Hematuria and proteinuria could also be secondary to NSAID usage  -Most recent UA with no blood or protein  -CT abdomen from 6/22/2020 showed mild left hydronephrosis 3 mm calculus at the left ureterovesical vesicle junction right kidney normal  - Acid base and lytes stable  - Clinically the patient appears to be euvolemic  -Did discuss with the patient does he have risk factors for CKD such as NSAID usage as well as obesity to hyperfiltration  - Recommend to avoid use of NSAIDs, nephrotoxins   Caution advised with regards to exposure to IV contrast dye    - Discussed with the patient in depth his renal status  - Advised the patient that when his GFR is close to 20mL/min then will start discussing about RRT(renal replacement therapy) options such as renal transplant, peritoneal dialysis and hemodialysis  - Informed the patient about the various options for Renal Replacement therapy  - Discussed with the patient how we need to work together to delay the progression of CKD with optimal BP control based on their age and co-morbidities and trying to reduce proteinuria by the use of anti-proteinuric agents  Blood pressure:  - Patient is on no medications  - Goal BP of <  130/80 based on age and comorbidities  - Instructed to follow low sodium (2gm)diet  Hemoglobin:  - Goal Hb of 10-12 g/dL  - Most recent labs suggestive of 14 1 g/dL  -No role for IV iron    MBD(Mineral Bone Disease): - Based on patients CKD stage following is the goal of therapy  - Maintain calcium phosphorus product of < 55  Proteinuria/hematuria:  - most recent UA from 6/22/2020 showing positive protein no blood, however UA from 6/10/2020 did have blood and protein both concentrated urine specimens  Also at that time he was noted to have mild left hydronephrosis and ureteral stone  -Appears to have resolution of proteinuria and hematuria  -Most recent UA from 5/16/2023 no blood or protein  Protein creatinine ratio 60 mg, concentrated specimen has minimal proteinuria  - currently on therapy for proteinuria with no medications  -See above    Lipids:  - goal LDL less than 70  - Management as per PCP    Nutrition/obesity:  - Encouraged patient to follow a diet comprising of moderate potassium, low phosphorus and protein restriction to 0 8gm/kg, increase hydration  Advised of dietary habits and weight loss  Advised patient to lose weight  - Will check serum albumin with next blood work       Followup:  - Patient is to follow-up as needed with nephrology, in the meantime we will continue follow-up with PCP and return to nephrology for any further renal issues mira,    Dianne Brian MD, "[place], 5/16/2023, 2:09 PM             SUBJECTIVE: 42 y o  male presents to the office for routine follow-up  No NSAID use no recent hospitalization no issues with edema thankful for the care information that is gone today  Happy to hear renal parameters are stable and that he does not have any CKD  No recent stone episodes is trying to drink more fluids  Review of Systems   Constitutional: Negative for chills and fever  HENT: Negative for congestion and sore throat  Respiratory: Negative for cough, shortness of breath and wheezing  Cardiovascular: Negative for leg swelling  Gastrointestinal: Negative for diarrhea and vomiting  Genitourinary: Negative for difficulty urinating, dysuria and hematuria  Musculoskeletal: Negative for back pain  Neurological: Negative for dizziness, light-headedness and headaches  Psychiatric/Behavioral: Negative for agitation and confusion  All other systems reviewed and are negative  PAST MEDICAL HISTORY:  Past Medical History:   Diagnosis Date   • Condyloma acuminatum        PROBLEM LIST    Patient Active Problem List   Diagnosis   • Acute on chronic cholecystitis   • S/P cholecystectomy   • Elevated serum creatinine   • Hematuria   • Proteinuria       PAST SURGICAL HISTORY:  Past Surgical History:   Procedure Laterality Date   • TONSILLECTOMY AND ADENOIDECTOMY         SOCIAL HISTORY :   reports that he has never smoked  He has never used smokeless tobacco  He reports current alcohol use  He reports that he does not use drugs  FAMILY HISTORY:  History reviewed  No pertinent family history  ALLERGIES:  No Known Allergies        PHYSICAL EXAM:  Vitals:    05/16/23 1350   BP: 100/76   BP Location: Left arm   Patient Position: Sitting   Cuff Size: Large   Weight: 109 kg (241 lb)   Height: 5' 8\" (1 727 m)     Body mass index is 36 64 kg/m²  Physical Exam  Vitals reviewed  Constitutional:       General: He is not in acute distress       Appearance: " "Normal appearance  He is obese  He is not ill-appearing, toxic-appearing or diaphoretic  HENT:      Head: Normocephalic and atraumatic  Mouth/Throat:      Mouth: Mucous membranes are moist       Pharynx: Oropharynx is clear  No oropharyngeal exudate  Eyes:      General: No scleral icterus  Conjunctiva/sclera: Conjunctivae normal    Cardiovascular:      Rate and Rhythm: Normal rate  Heart sounds: Normal heart sounds  No friction rub  Pulmonary:      Effort: No respiratory distress  Breath sounds: Normal breath sounds  No stridor  Abdominal:      General: There is no distension  Palpations: Abdomen is soft  There is no mass  Tenderness: There is no right CVA tenderness or left CVA tenderness  Musculoskeletal:         General: No swelling  Cervical back: Normal range of motion  No rigidity  Skin:     General: Skin is warm  Coloration: Skin is not jaundiced  Neurological:      General: No focal deficit present  Mental Status: He is alert and oriented to person, place, and time  Psychiatric:         Mood and Affect: Mood normal          Behavior: Behavior normal          LABORATORY DATA:     Results from last 6 Months   Lab Units 05/16/23  0857   POTASSIUM mmol/L 3 8   CHLORIDE mmol/L 110*   CO2 mmol/L 28   BUN mg/dL 17   CREATININE mg/dL 1 33*   CALCIUM mg/dL 9 1   PHOSPHORUS mg/dL 3 1        rest all reviewed    RADIOLOGY:  No orders to display     Rest all reviewed        MEDICATIONS:  No current outpatient medications on file  Portions of the record may have been created with voice recognition software  Occasional wrong word or \"sound a like\" substitutions may have occurred due to the inherent limitations of voice recognition software  Read the chart carefully and recognize, using context, where substitutions have occurred  If you have any questions, please contact the dictating provider        "

## 2023-08-17 ENCOUNTER — OFFICE VISIT (OUTPATIENT)
Dept: FAMILY MEDICINE CLINIC | Facility: CLINIC | Age: 38
End: 2023-08-17

## 2023-08-17 VITALS
BODY MASS INDEX: 36.83 KG/M2 | HEIGHT: 68 IN | DIASTOLIC BLOOD PRESSURE: 64 MMHG | HEART RATE: 56 BPM | WEIGHT: 243 LBS | SYSTOLIC BLOOD PRESSURE: 108 MMHG

## 2023-08-17 DIAGNOSIS — Z02.4 ENCOUNTER FOR CDL (COMMERCIAL DRIVING LICENSE) EXAM: Primary | ICD-10-CM

## 2023-08-17 PROCEDURE — 99499 UNLISTED E&M SERVICE: CPT | Performed by: FAMILY MEDICINE
